# Patient Record
Sex: FEMALE | Race: WHITE
[De-identification: names, ages, dates, MRNs, and addresses within clinical notes are randomized per-mention and may not be internally consistent; named-entity substitution may affect disease eponyms.]

---

## 2022-09-20 ENCOUNTER — HOSPITAL ENCOUNTER (INPATIENT)
Dept: HOSPITAL 19 - LDRO | Age: 27
LOS: 2 days | Discharge: HOME | End: 2022-09-22
Payer: COMMERCIAL

## 2022-09-20 VITALS — DIASTOLIC BLOOD PRESSURE: 79 MMHG | HEART RATE: 90 BPM | SYSTOLIC BLOOD PRESSURE: 121 MMHG | TEMPERATURE: 98.4 F

## 2022-09-20 VITALS — DIASTOLIC BLOOD PRESSURE: 62 MMHG | SYSTOLIC BLOOD PRESSURE: 119 MMHG | HEART RATE: 96 BPM

## 2022-09-20 VITALS — SYSTOLIC BLOOD PRESSURE: 116 MMHG | HEART RATE: 80 BPM | DIASTOLIC BLOOD PRESSURE: 76 MMHG

## 2022-09-20 VITALS — SYSTOLIC BLOOD PRESSURE: 125 MMHG | DIASTOLIC BLOOD PRESSURE: 79 MMHG | HEART RATE: 78 BPM

## 2022-09-20 VITALS — DIASTOLIC BLOOD PRESSURE: 71 MMHG | SYSTOLIC BLOOD PRESSURE: 138 MMHG | HEART RATE: 79 BPM

## 2022-09-20 VITALS — SYSTOLIC BLOOD PRESSURE: 114 MMHG | HEART RATE: 90 BPM | DIASTOLIC BLOOD PRESSURE: 76 MMHG

## 2022-09-20 VITALS — DIASTOLIC BLOOD PRESSURE: 71 MMHG | SYSTOLIC BLOOD PRESSURE: 120 MMHG | HEART RATE: 85 BPM

## 2022-09-20 VITALS — HEART RATE: 85 BPM | DIASTOLIC BLOOD PRESSURE: 70 MMHG | SYSTOLIC BLOOD PRESSURE: 132 MMHG

## 2022-09-20 VITALS — HEART RATE: 85 BPM | SYSTOLIC BLOOD PRESSURE: 132 MMHG | DIASTOLIC BLOOD PRESSURE: 84 MMHG

## 2022-09-20 VITALS — HEART RATE: 85 BPM | DIASTOLIC BLOOD PRESSURE: 86 MMHG | SYSTOLIC BLOOD PRESSURE: 131 MMHG

## 2022-09-20 VITALS — HEART RATE: 91 BPM | SYSTOLIC BLOOD PRESSURE: 137 MMHG | DIASTOLIC BLOOD PRESSURE: 82 MMHG

## 2022-09-20 VITALS — DIASTOLIC BLOOD PRESSURE: 69 MMHG | SYSTOLIC BLOOD PRESSURE: 121 MMHG | HEART RATE: 83 BPM

## 2022-09-20 VITALS — HEART RATE: 98 BPM | SYSTOLIC BLOOD PRESSURE: 129 MMHG | DIASTOLIC BLOOD PRESSURE: 88 MMHG

## 2022-09-20 VITALS — SYSTOLIC BLOOD PRESSURE: 116 MMHG | HEART RATE: 95 BPM | TEMPERATURE: 98.9 F | DIASTOLIC BLOOD PRESSURE: 69 MMHG

## 2022-09-20 VITALS — HEART RATE: 80 BPM | DIASTOLIC BLOOD PRESSURE: 79 MMHG | SYSTOLIC BLOOD PRESSURE: 124 MMHG

## 2022-09-20 VITALS — SYSTOLIC BLOOD PRESSURE: 131 MMHG | DIASTOLIC BLOOD PRESSURE: 84 MMHG | HEART RATE: 86 BPM

## 2022-09-20 VITALS — TEMPERATURE: 97.8 F | HEART RATE: 87 BPM | DIASTOLIC BLOOD PRESSURE: 52 MMHG | SYSTOLIC BLOOD PRESSURE: 105 MMHG

## 2022-09-20 VITALS — SYSTOLIC BLOOD PRESSURE: 127 MMHG | DIASTOLIC BLOOD PRESSURE: 73 MMHG | HEART RATE: 86 BPM

## 2022-09-20 VITALS — DIASTOLIC BLOOD PRESSURE: 59 MMHG | HEART RATE: 85 BPM | SYSTOLIC BLOOD PRESSURE: 127 MMHG

## 2022-09-20 VITALS — TEMPERATURE: 97.6 F | DIASTOLIC BLOOD PRESSURE: 81 MMHG | SYSTOLIC BLOOD PRESSURE: 123 MMHG | HEART RATE: 83 BPM

## 2022-09-20 VITALS — SYSTOLIC BLOOD PRESSURE: 135 MMHG | HEART RATE: 93 BPM | DIASTOLIC BLOOD PRESSURE: 84 MMHG

## 2022-09-20 VITALS — SYSTOLIC BLOOD PRESSURE: 126 MMHG | DIASTOLIC BLOOD PRESSURE: 79 MMHG | HEART RATE: 80 BPM

## 2022-09-20 VITALS — SYSTOLIC BLOOD PRESSURE: 117 MMHG | DIASTOLIC BLOOD PRESSURE: 66 MMHG | HEART RATE: 82 BPM

## 2022-09-20 VITALS — SYSTOLIC BLOOD PRESSURE: 121 MMHG | DIASTOLIC BLOOD PRESSURE: 71 MMHG | HEART RATE: 88 BPM

## 2022-09-20 VITALS — DIASTOLIC BLOOD PRESSURE: 79 MMHG | SYSTOLIC BLOOD PRESSURE: 127 MMHG | HEART RATE: 87 BPM

## 2022-09-20 VITALS — HEART RATE: 82 BPM | SYSTOLIC BLOOD PRESSURE: 129 MMHG | DIASTOLIC BLOOD PRESSURE: 84 MMHG

## 2022-09-20 VITALS — HEART RATE: 86 BPM | DIASTOLIC BLOOD PRESSURE: 79 MMHG | SYSTOLIC BLOOD PRESSURE: 124 MMHG

## 2022-09-20 VITALS — HEIGHT: 62.01 IN | WEIGHT: 160.06 LBS | BODY MASS INDEX: 29.08 KG/M2

## 2022-09-20 VITALS — SYSTOLIC BLOOD PRESSURE: 125 MMHG | HEART RATE: 85 BPM | DIASTOLIC BLOOD PRESSURE: 75 MMHG

## 2022-09-20 VITALS — HEART RATE: 84 BPM

## 2022-09-20 VITALS — SYSTOLIC BLOOD PRESSURE: 120 MMHG | HEART RATE: 94 BPM | DIASTOLIC BLOOD PRESSURE: 71 MMHG

## 2022-09-20 VITALS — SYSTOLIC BLOOD PRESSURE: 131 MMHG | HEART RATE: 88 BPM | DIASTOLIC BLOOD PRESSURE: 75 MMHG

## 2022-09-20 VITALS — TEMPERATURE: 98.5 F | SYSTOLIC BLOOD PRESSURE: 144 MMHG | HEART RATE: 87 BPM | DIASTOLIC BLOOD PRESSURE: 90 MMHG

## 2022-09-20 VITALS — DIASTOLIC BLOOD PRESSURE: 81 MMHG | SYSTOLIC BLOOD PRESSURE: 134 MMHG | HEART RATE: 86 BPM

## 2022-09-20 VITALS — DIASTOLIC BLOOD PRESSURE: 72 MMHG | HEART RATE: 85 BPM | SYSTOLIC BLOOD PRESSURE: 123 MMHG

## 2022-09-20 VITALS — HEART RATE: 96 BPM | SYSTOLIC BLOOD PRESSURE: 138 MMHG | DIASTOLIC BLOOD PRESSURE: 88 MMHG

## 2022-09-20 VITALS — SYSTOLIC BLOOD PRESSURE: 121 MMHG | DIASTOLIC BLOOD PRESSURE: 72 MMHG | HEART RATE: 81 BPM

## 2022-09-20 VITALS — DIASTOLIC BLOOD PRESSURE: 88 MMHG | HEART RATE: 77 BPM | SYSTOLIC BLOOD PRESSURE: 129 MMHG

## 2022-09-20 VITALS — DIASTOLIC BLOOD PRESSURE: 95 MMHG | HEART RATE: 87 BPM | SYSTOLIC BLOOD PRESSURE: 133 MMHG

## 2022-09-20 VITALS — DIASTOLIC BLOOD PRESSURE: 99 MMHG | HEART RATE: 84 BPM | SYSTOLIC BLOOD PRESSURE: 135 MMHG

## 2022-09-20 VITALS — DIASTOLIC BLOOD PRESSURE: 77 MMHG | SYSTOLIC BLOOD PRESSURE: 121 MMHG | HEART RATE: 79 BPM

## 2022-09-20 DIAGNOSIS — Z3A.39: ICD-10-CM

## 2022-09-20 LAB
BASOPHILS # BLD: 0 K/MM3 (ref 0–0.2)
BASOPHILS NFR BLD AUTO: 0.3 % (ref 0–2)
EOSINOPHIL # BLD: 0.2 K/MM3 (ref 0–0.7)
EOSINOPHIL NFR BLD: 1.3 % (ref 0–4)
ERYTHROCYTE [DISTWIDTH] IN BLOOD BY AUTOMATED COUNT: 13.6 % (ref 11.5–14.5)
GRANULOCYTES # BLD AUTO: 70 % (ref 42.2–75.2)
HCT VFR BLD AUTO: 36 % (ref 37–47)
HGB BLD-MCNC: 12.3 G/DL (ref 12.5–16)
LYMPHOCYTES # BLD: 2.4 K/MM3 (ref 1.2–3.4)
LYMPHOCYTES NFR BLD: 21.4 % (ref 20–51)
MCH RBC QN AUTO: 31 PG (ref 27–31)
MCHC RBC AUTO-ENTMCNC: 34 G/DL (ref 33–37)
MCV RBC AUTO: 89 FL (ref 80–100)
MONOCYTES # BLD: 0.7 K/MM3 (ref 0.1–0.6)
MONOCYTES NFR BLD AUTO: 6.1 % (ref 1.7–9.3)
NEUTROPHILS # BLD: 7.9 K/MM3 (ref 1.4–6.5)
PLATELET # BLD AUTO: 183 K/MM3 (ref 130–400)
PMV BLD AUTO: 12.4 FL (ref 7.4–10.4)
RBC # BLD AUTO: 4.03 M/MM3 (ref 4.1–5.3)

## 2022-09-20 PROCEDURE — 3E033VJ INTRODUCTION OF OTHER HORMONE INTO PERIPHERAL VEIN, PERCUTANEOUS APPROACH: ICD-10-PCS

## 2022-09-20 PROCEDURE — 0KQM0ZZ REPAIR PERINEUM MUSCLE, OPEN APPROACH: ICD-10-PCS

## 2022-09-20 NOTE — NUR
1800 PT ABLE TO HOLD BOTH LEGS UP FOR 5 SEC EACH. TRANSFERRED TO BATHROOM IN
WHEELCHAIR. PT VOIDED 100MLS, WAS CLEANED UP, GOWN AND PAD CHANGED, AND WAS
TRANSPORTED TO POSTPARTUM ROOM IN WHEELCHAIR. WILL CONTINUE TO MONITOR.

## 2022-09-20 NOTE — NUR
1400 SVE AT THIS TIME, PT COMPLETE/0 STATION. DR. GOODPASTURE CALLED AT THIS
TIME. ORDERS TO START PUSHING.
 
1408 STARTED PUSHING WITH PT. GOOD MATERNAL EFFORT. EFM TRACING CAT 1.
 
1437 CALLED DR. GOODPASTURE IN FOR DELIVERY. ROOM SET UP FOR DELIVERY.
APPROPRIATE STAFF NOTIFIED.
 
1458 EPISIOTOMY PERFORMED PER DR. GOODPASTURE DUE TO FETAL BRADYCARDIA. 
OF VIABLE FEMALE INFANT PER DR. GOODPASTURE. NCX1 NOTED.  INFANT PLACED ON
MOTHERS CHEST AND CARE ASSUMED BY JAZIEL BRYANT RN.
 
1502  OF PLACENTA PER DR. GOODPASTURE. PITOCIN BOLUS INFUSING PER PROTOCOL.
FUNDUS FIRM AND AT THE UMBILICUS. BLEEDING SMALL. VITAL SIGNS STABLE.
 
ROOM PUT BACK TOGETHER AT THIS TIME. PT COMFORTABLE. WILL CONTINUE TO MONITOR.

## 2022-09-20 NOTE — NUR
1030 CADEN MARINELLI IN ROOM FOR EPIDURAL PLACEMENT. LR BOLUS INFUSING PER
PROTOCOL. BLOOD PRESSURE CUFF AND PULSE OX ON. PT SITTING UP ON EDGE OF BED,
DIFFICULTY TRACING EFM DUE TO MATERNAL POSITIONING. EFM TRACING CAT 1 BEFORE
SITTING UP.
 
1041 TEST DOSE ADMINISTERED AT THIS TIME PER CADEN MARINELLI. PT TOLERATED WELL.
VITAL SIGNS STABLE. EFM TRACING CAT 1. PT RETURNED TO BED AFTER TAPE APPLIED.
WILL CONTINUE TO MONITOR.

## 2022-09-20 NOTE — NUR
0640 PT AMBULATORY TO UNIT WITH SUPPORT PERSON, WENDI. PT STATES HER WATER
BROKE THIS MORNING AT 0505. PT REPORTS NO CTX, POSITIVE FETAL MOVEMENT, AND
CONTINUOUS LEAKING OF FLUID. AMNIOSURE DONE AT THIS TIME AND WAS POSITIVE FOR
AMNIOTIC FLUID. FLUID ON PAD CLEAR. SVE 3/80/-2. UPDATED PT ON PLAN OF CARE.
CONSENT FORMS SIGNED. EDUCATION PACKET AND MENU PROVIDED.

## 2022-09-21 VITALS — SYSTOLIC BLOOD PRESSURE: 128 MMHG | HEART RATE: 88 BPM | TEMPERATURE: 98.3 F | DIASTOLIC BLOOD PRESSURE: 68 MMHG

## 2022-09-21 VITALS — HEART RATE: 76 BPM | TEMPERATURE: 98.3 F | SYSTOLIC BLOOD PRESSURE: 125 MMHG | DIASTOLIC BLOOD PRESSURE: 77 MMHG

## 2022-09-21 VITALS — DIASTOLIC BLOOD PRESSURE: 84 MMHG | HEART RATE: 84 BPM | TEMPERATURE: 97.3 F | SYSTOLIC BLOOD PRESSURE: 132 MMHG

## 2022-09-21 VITALS — TEMPERATURE: 98.8 F | HEART RATE: 88 BPM | DIASTOLIC BLOOD PRESSURE: 90 MMHG | SYSTOLIC BLOOD PRESSURE: 134 MMHG

## 2022-09-21 VITALS — DIASTOLIC BLOOD PRESSURE: 86 MMHG | TEMPERATURE: 98 F | HEART RATE: 89 BPM | SYSTOLIC BLOOD PRESSURE: 123 MMHG

## 2022-09-21 NOTE — NUR
Initial visit; Patient thanked  for offering congratulations and God's
blessings for the birth of their son.  thanked patient for choosing
[facility].

## 2022-09-22 VITALS — DIASTOLIC BLOOD PRESSURE: 93 MMHG | SYSTOLIC BLOOD PRESSURE: 142 MMHG | HEART RATE: 79 BPM | TEMPERATURE: 98 F

## 2022-09-22 NOTE — NUR
PATIENT DISCHARGED. PATIENT AMBULATORY OFF UNIT ACCOMPANIED BY SPOUSE AND
PATIENT'S MOTHER. PATIENT ALSO ACCOMPANIED BY THIS RN.